# Patient Record
Sex: FEMALE | Race: WHITE | NOT HISPANIC OR LATINO
[De-identification: names, ages, dates, MRNs, and addresses within clinical notes are randomized per-mention and may not be internally consistent; named-entity substitution may affect disease eponyms.]

---

## 2019-03-24 ENCOUNTER — FORM ENCOUNTER (OUTPATIENT)
Age: 69
End: 2019-03-24

## 2019-03-25 ENCOUNTER — OUTPATIENT (OUTPATIENT)
Dept: OUTPATIENT SERVICES | Facility: HOSPITAL | Age: 69
LOS: 1 days | End: 2019-03-25
Payer: MEDICARE

## 2019-03-25 ENCOUNTER — NON-APPOINTMENT (OUTPATIENT)
Age: 69
End: 2019-03-25

## 2019-03-25 ENCOUNTER — APPOINTMENT (OUTPATIENT)
Dept: PULMONOLOGY | Facility: CLINIC | Age: 69
End: 2019-03-25
Payer: MEDICARE

## 2019-03-25 VITALS
BODY MASS INDEX: 22.2 KG/M2 | WEIGHT: 130 LBS | OXYGEN SATURATION: 97 % | RESPIRATION RATE: 12 BRPM | HEIGHT: 64 IN | TEMPERATURE: 98.4 F | DIASTOLIC BLOOD PRESSURE: 80 MMHG | HEART RATE: 70 BPM | SYSTOLIC BLOOD PRESSURE: 120 MMHG

## 2019-03-25 DIAGNOSIS — R06.02 SHORTNESS OF BREATH: ICD-10-CM

## 2019-03-25 DIAGNOSIS — Z87.19 PERSONAL HISTORY OF OTHER DISEASES OF THE DIGESTIVE SYSTEM: ICD-10-CM

## 2019-03-25 DIAGNOSIS — Z87.891 PERSONAL HISTORY OF NICOTINE DEPENDENCE: ICD-10-CM

## 2019-03-25 DIAGNOSIS — Z86.79 PERSONAL HISTORY OF OTHER DISEASES OF THE CIRCULATORY SYSTEM: ICD-10-CM

## 2019-03-25 PROBLEM — Z00.00 ENCOUNTER FOR PREVENTIVE HEALTH EXAMINATION: Status: ACTIVE | Noted: 2019-03-25

## 2019-03-25 PROCEDURE — 94010 BREATHING CAPACITY TEST: CPT

## 2019-03-25 PROCEDURE — 71046 X-RAY EXAM CHEST 2 VIEWS: CPT | Mod: 26

## 2019-03-25 PROCEDURE — 71046 X-RAY EXAM CHEST 2 VIEWS: CPT

## 2019-03-25 PROCEDURE — 36415 COLL VENOUS BLD VENIPUNCTURE: CPT

## 2019-03-25 PROCEDURE — 99204 OFFICE O/P NEW MOD 45 MIN: CPT | Mod: 25

## 2019-03-25 RX ORDER — AMLODIPINE BESYLATE AND VALSARTAN 10; 160 MG/1; MG/1
10-160 TABLET, FILM COATED ORAL
Refills: 0 | Status: ACTIVE | COMMUNITY

## 2019-03-25 RX ORDER — ASPIRIN 81 MG/1
81 TABLET, CHEWABLE ORAL
Refills: 0 | Status: ACTIVE | COMMUNITY

## 2019-03-25 RX ORDER — ESOMEPRAZOLE MAGNESIUM 40 MG/1
40 CAPSULE, DELAYED RELEASE ORAL
Refills: 0 | Status: ACTIVE | COMMUNITY

## 2019-03-25 RX ORDER — SIMVASTATIN 80 MG/1
TABLET, FILM COATED ORAL
Refills: 0 | Status: ACTIVE | COMMUNITY

## 2019-03-25 RX ORDER — BISOPROLOL FUMARATE 5 MG/1
TABLET, FILM COATED ORAL
Refills: 0 | Status: ACTIVE | COMMUNITY

## 2019-03-25 RX ORDER — ALBUTEROL SULFATE 90 UG/1
108 (90 BASE) AEROSOL, METERED RESPIRATORY (INHALATION)
Qty: 1 | Refills: 3 | Status: ACTIVE | COMMUNITY
Start: 2019-03-25 | End: 1900-01-01

## 2019-03-26 ENCOUNTER — OUTPATIENT (OUTPATIENT)
Dept: OUTPATIENT SERVICES | Facility: HOSPITAL | Age: 69
LOS: 1 days | End: 2019-03-26
Payer: MEDICARE

## 2019-03-26 DIAGNOSIS — I25.10 ATHEROSCLEROTIC HEART DISEASE OF NATIVE CORONARY ARTERY WITHOUT ANGINA PECTORIS: ICD-10-CM

## 2019-03-26 DIAGNOSIS — R94.30 ABNORMAL RESULT OF CARDIOVASCULAR FUNCTION STUDY, UNSPECIFIED: ICD-10-CM

## 2019-03-26 PROCEDURE — 93016 CV STRESS TEST SUPVJ ONLY: CPT

## 2019-03-26 PROCEDURE — 93325 DOPPLER ECHO COLOR FLOW MAPG: CPT | Mod: 26

## 2019-03-26 PROCEDURE — 93351 STRESS TTE COMPLETE: CPT

## 2019-03-26 PROCEDURE — 93018 CV STRESS TEST I&R ONLY: CPT

## 2019-03-26 PROCEDURE — 93320 DOPPLER ECHO COMPLETE: CPT | Mod: 26

## 2019-03-26 PROCEDURE — 93350 STRESS TTE ONLY: CPT | Mod: 26

## 2019-03-29 PROBLEM — Z87.891 FORMER SMOKER: Status: ACTIVE | Noted: 2019-03-25

## 2019-03-29 LAB
BASOPHILS # BLD AUTO: 0.05 K/UL
BASOPHILS NFR BLD AUTO: 0.6 %
DEPRECATED D DIMER PPP IA-ACNC: 163 NG/ML DDU
EOSINOPHIL # BLD AUTO: 0.16 K/UL
EOSINOPHIL NFR BLD AUTO: 1.8 %
HCT VFR BLD CALC: 38 %
HGB BLD-MCNC: 12.1 G/DL
IMM GRANULOCYTES NFR BLD AUTO: 0.2 %
LYMPHOCYTES # BLD AUTO: 1.75 K/UL
LYMPHOCYTES NFR BLD AUTO: 20 %
MAN DIFF?: NORMAL
MCHC RBC-ENTMCNC: 28.5 PG
MCHC RBC-ENTMCNC: 31.8 GM/DL
MCV RBC AUTO: 89.6 FL
MONOCYTES # BLD AUTO: 0.62 K/UL
MONOCYTES NFR BLD AUTO: 7.1 %
NEUTROPHILS # BLD AUTO: 6.13 K/UL
NEUTROPHILS NFR BLD AUTO: 70.3 %
PLATELET # BLD AUTO: 334 K/UL
RBC # BLD: 4.24 M/UL
RBC # FLD: 13.2 %
WBC # FLD AUTO: 8.73 K/UL

## 2019-03-29 NOTE — PHYSICAL EXAM
[General Appearance - Well Developed] : well developed [General Appearance - Well Nourished] : well nourished [Normal Conjunctiva] : the conjunctiva exhibited no abnormalities [Normal Oropharynx] : normal oropharynx [Neck Appearance] : the appearance of the neck was normal [Heart Rate And Rhythm] : heart rate and rhythm were normal [Heart Sounds] : normal S1 and S2 [Respiration, Rhythm And Depth] : normal respiratory rhythm and effort [Exaggerated Use Of Accessory Muscles For Inspiration] : no accessory muscle use [Auscultation Breath Sounds / Voice Sounds] : lungs were clear to auscultation bilaterally [Bowel Sounds] : normal bowel sounds [Abdomen Soft] : soft [Abdomen Tenderness] : non-tender [Abnormal Walk] : normal gait [Nail Clubbing] : no clubbing of the fingernails [Cyanosis, Localized] : no localized cyanosis [Skin Color & Pigmentation] : normal skin color and pigmentation [Skin Turgor] : normal skin turgor [] : no rash [Oriented To Time, Place, And Person] : oriented to person, place, and time [Impaired Insight] : insight and judgment were intact [Affect] : the affect was normal

## 2019-03-29 NOTE — CONSULT LETTER
[Dear  ___] : Dear  [unfilled], [Consult Letter:] : I had the pleasure of evaluating your patient, [unfilled]. [Please see my note below.] : Please see my note below. [Consult Closing:] : Thank you very much for allowing me to participate in the care of this patient.  If you have any questions, please do not hesitate to contact me. [Sincerely,] : Sincerely, [FreeTextEntry3] : Grace Aranda MD, [facility]

## 2019-03-29 NOTE — REVIEW OF SYSTEMS
[Cough] : no cough [Sputum] : not coughing up ~M sputum [Dyspnea] : dyspnea [Chest Tightness] : no chest tightness [Pleuritic Pain] : no pleuritic pain [Negative] : Psychiatric

## 2019-03-29 NOTE — HISTORY OF PRESENT ILLNESS
[FreeTextEntry1] : 68F former smoker with subacute to chronic sob. Pt reports over last 2 months noticed more MCNEILL with exercise like hiking, it is worse here in [facility] and improves when she travels. She has travelled on a plane for over 10 hours in the last 2 months. Denies any leg swelling, no chest pain, mild cough and phlegm. Described herself as very fit until recently.

## 2019-03-29 NOTE — ASSESSMENT
[FreeTextEntry1] : 68F with subacute to chronic dyspnea on exertion only. She has travelled by plane recently and I checked her ddimer to help r/o DVT/PE, which is normal/low. No evidence of anemia with normal Hgb in CBC. She is a former smoker and has mild hyperinflation on CXR, but no obstruction on spirometry and FEV1>100%.. Pt reports negative cardiac workup, but stress test repeat is pending tomorrow. For now, I would recommend trial of albuterol prn and return to complete full PFT study when pt returns from her trip

## 2021-03-19 ENCOUNTER — EMERGENCY (EMERGENCY)
Facility: HOSPITAL | Age: 71
LOS: 1 days | Discharge: HOME | End: 2021-03-19
Admitting: EMERGENCY MEDICINE
Payer: MEDICARE

## 2021-03-19 VITALS
OXYGEN SATURATION: 97 % | TEMPERATURE: 99 F | HEART RATE: 72 BPM | SYSTOLIC BLOOD PRESSURE: 136 MMHG | DIASTOLIC BLOOD PRESSURE: 76 MMHG | RESPIRATION RATE: 18 BRPM

## 2021-03-19 DIAGNOSIS — Z20.822 CONTACT WITH AND (SUSPECTED) EXPOSURE TO COVID-19: ICD-10-CM

## 2021-03-19 LAB — SARS-COV-2 RNA SPEC QL NAA+PROBE: SIGNIFICANT CHANGE UP

## 2021-03-19 PROCEDURE — 99283 EMERGENCY DEPT VISIT LOW MDM: CPT

## 2021-03-19 PROCEDURE — 99282 EMERGENCY DEPT VISIT SF MDM: CPT | Mod: CS

## 2021-03-19 PROCEDURE — U0003: CPT

## 2021-03-19 PROCEDURE — U0005: CPT

## 2021-03-19 NOTE — ED PROVIDER NOTE - PATIENT PORTAL LINK FT
You can access the FollowMyHealth Patient Portal offered by [facility] by registering at the following website: [email or web address]. By joining [employer]’s FollowMyHealth portal, you will also be able to view your health information using other applications (apps) compatible with our system.

## 2021-04-02 ENCOUNTER — OUTPATIENT (OUTPATIENT)
Dept: OUTPATIENT SERVICES | Facility: HOSPITAL | Age: 71
LOS: 1 days | End: 2021-04-02
Payer: MEDICARE

## 2021-04-02 DIAGNOSIS — I25.10 ATHEROSCLEROTIC HEART DISEASE OF NATIVE CORONARY ARTERY WITHOUT ANGINA PECTORIS: ICD-10-CM

## 2021-04-02 PROCEDURE — 93351 STRESS TTE COMPLETE: CPT

## 2021-04-02 PROCEDURE — 93351 STRESS TTE COMPLETE: CPT | Mod: 26,52

## 2021-04-03 ENCOUNTER — EMERGENCY (EMERGENCY)
Facility: HOSPITAL | Age: 71
LOS: 1 days | Discharge: HOME | End: 2021-04-03
Admitting: EMERGENCY MEDICINE
Payer: MEDICARE

## 2021-04-03 VITALS
TEMPERATURE: 98 F | RESPIRATION RATE: 18 BRPM | SYSTOLIC BLOOD PRESSURE: 124 MMHG | OXYGEN SATURATION: 99 % | DIASTOLIC BLOOD PRESSURE: 74 MMHG | HEART RATE: 65 BPM

## 2021-04-03 LAB — SARS-COV-2 RNA SPEC QL NAA+PROBE: SIGNIFICANT CHANGE UP

## 2021-04-03 PROCEDURE — 99282 EMERGENCY DEPT VISIT SF MDM: CPT | Mod: CS

## 2021-04-03 PROCEDURE — U0003: CPT

## 2021-04-03 PROCEDURE — 99283 EMERGENCY DEPT VISIT LOW MDM: CPT

## 2021-04-03 PROCEDURE — U0005: CPT

## 2021-04-03 NOTE — ED PROVIDER NOTE - PATIENT PORTAL LINK FT
You can access the FollowMyHealth Patient Portal offered by [facility] by registering at the following website: [email or web address]. By joining [employer]’s FollowMyHealth portal, you will also be able to view your health information using other applications (apps) compatible with our system.

## 2021-04-05 DIAGNOSIS — Z20.822 CONTACT WITH AND (SUSPECTED) EXPOSURE TO COVID-19: ICD-10-CM

## 2021-11-22 ENCOUNTER — EMERGENCY (EMERGENCY)
Facility: HOSPITAL | Age: 71
LOS: 1 days | Discharge: HOME | End: 2021-11-22
Attending: EMERGENCY MEDICINE | Admitting: EMERGENCY MEDICINE
Payer: MEDICARE

## 2021-11-22 VITALS
HEIGHT: 62 IN | WEIGHT: 121.25 LBS | DIASTOLIC BLOOD PRESSURE: 74 MMHG | TEMPERATURE: 98 F | SYSTOLIC BLOOD PRESSURE: 127 MMHG | RESPIRATION RATE: 18 BRPM | OXYGEN SATURATION: 96 % | HEART RATE: 86 BPM

## 2021-11-22 DIAGNOSIS — S01.511A LACERATION WITHOUT FOREIGN BODY OF LIP, INITIAL ENCOUNTER: ICD-10-CM
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
## 2021-11-22 DIAGNOSIS — I10 ESSENTIAL (PRIMARY) HYPERTENSION: ICD-10-CM
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
## 2021-11-22 DIAGNOSIS — Z88.0 ALLERGY STATUS TO PENICILLIN: ICD-10-CM
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
## 2021-11-22 DIAGNOSIS — Z23 ENCOUNTER FOR IMMUNIZATION: ICD-10-CM

## 2021-11-22 PROCEDURE — G1004: CPT

## 2021-11-22 PROCEDURE — 12011 RPR F/E/E/N/L/M 2.5 CM/<: CPT

## 2021-11-22 PROCEDURE — 99284 EMERGENCY DEPT VISIT MOD MDM: CPT | Mod: 25

## 2021-11-22 PROCEDURE — 70486 CT MAXILLOFACIAL W/O DYE: CPT | Mod: 26,MG

## 2021-11-22 PROCEDURE — 73110 X-RAY EXAM OF WRIST: CPT | Mod: 26,RT

## 2021-11-22 PROCEDURE — 90471 IMMUNIZATION ADMIN: CPT

## 2021-11-22 PROCEDURE — 73110 X-RAY EXAM OF WRIST: CPT

## 2021-11-22 PROCEDURE — 70450 CT HEAD/BRAIN W/O DYE: CPT | Mod: MG

## 2021-11-22 PROCEDURE — 70486 CT MAXILLOFACIAL W/O DYE: CPT | Mod: MG

## 2021-11-22 PROCEDURE — 90715 TDAP VACCINE 7 YRS/> IM: CPT

## 2021-11-22 PROCEDURE — 70450 CT HEAD/BRAIN W/O DYE: CPT | Mod: 26,MG

## 2021-11-22 RX ORDER — TETANUS TOXOID, REDUCED DIPHTHERIA TOXOID AND ACELLULAR PERTUSSIS VACCINE, ADSORBED 5; 2.5; 8; 8; 2.5 [IU]/.5ML; [IU]/.5ML; UG/.5ML; UG/.5ML; UG/.5ML
0.5 SUSPENSION INTRAMUSCULAR ONCE
Refills: 0 | Status: COMPLETED | OUTPATIENT
Start: 2021-11-22 | End: 2021-11-22

## 2021-11-22 RX ORDER — ACETAMINOPHEN 500 MG
650 TABLET ORAL ONCE
Refills: 0 | Status: COMPLETED | OUTPATIENT
Start: 2021-11-22 | End: 2021-11-22

## 2021-11-22 RX ADMIN — TETANUS TOXOID, REDUCED DIPHTHERIA TOXOID AND ACELLULAR PERTUSSIS VACCINE, ADSORBED 0.5 MILLILITER(S): 5; 2.5; 8; 8; 2.5 SUSPENSION INTRAMUSCULAR at 15:04

## 2021-11-22 RX ADMIN — Medication 650 MILLIGRAM(S): at 15:06

## 2021-11-22 NOTE — ED PROVIDER NOTE - CLINICAL SUMMARY MEDICAL DECISION MAKING FREE TEXT BOX
70 y/o f hx HTN presents s/p trip and fall, hitting her face today; vss, no neuro deficits on exam.  +swelling to nasal bridge and likely fracture, will plan for CT maxillofacial and CT head.  Lip lac repaired, tetanus updated, given tylenol for pain.

## 2021-11-22 NOTE — ED ADULT NURSE NOTE - OBJECTIVE STATEMENT
Pt presents to ED after mechanical fall today. Pt denies dizziness, lightheadedness, LOC, double vision. Pt states she tripped and fell over construction equipment. Pt cut her bottom lip on her teeth and states her nose looks swollen. Pt takes daily aspirin. Pt also states has been on antibiotics for bronchitis [id number].

## 2021-11-22 NOTE — ED PROVIDER NOTE - ENMT, MLM
+swelling to nasal bridge with ecchymosis, no active epistaxis, no septal hematoma.  1 cm lac to inside of lower lip, does not communicate with the outside of the lip, dentition intact

## 2021-11-22 NOTE — ED PROVIDER NOTE - ATTENDING CONTRIBUTION TO CARE
Vitals wnl, exam as above.  CT showing "Bilateral fractures of the nasal bones and of the anterior-superior nasal septum, minimally displaced. There is diffuse paranasal sinus opacification, which may be posttraumatic hemorrhage and/or concomitant sinusitis. No visible orbital/facial or skull base fracture."  Given tylenol/adacel. ppx abx for possible sinus involvement.  Discussed importance of outpt follow up and return precautions. Clinically no indication for further emergent ED workup or hospitalization at this time. Comfortable for dc, outpt f/u.   lip lac repaired by PA (given size of lac).   ddx: mechanical fall. subsequent nasal fx.

## 2021-11-22 NOTE — ED ADULT TRIAGE NOTE - CHIEF COMPLAINT QUOTE
Patient states was walking in street today, there was construction in the street, had mechanical trip and fall at 12 noon today and fell on face, denies any LOC, on Aspirin.  States was able to get up right away, arrives w/ dried blood on right nostril, laceration to lip, bleeding controlled, concerned for nasal/facial fracture.  Denies any dizziness, lighheadedness or headache, states has nasal and mouth pain.   Unknown Tetanus vaccine status.

## 2021-11-22 NOTE — ED PROVIDER NOTE - OBJECTIVE STATEMENT
70 y/o f hx HTN presents s/p mechanical fall, hit her face on the ground.  Pt reports pain to bridge of her nose, had a nosebleed which resolved.  Pt also has a laceration to the inside of her lower lip which is painful.  Denies LOC, neck/back pain, ext pain, all other ROS negative.

## 2021-11-22 NOTE — ED ADULT NURSE NOTE - NSIMPLEMENTINTERV_GEN_ALL_ED
Implemented All Fall Risk Interventions:  [place] to call system. Call bell, personal items and telephone within reach. Instruct patient to call for assistance. Room bathroom lighting operational. Non-slip footwear when patient is off stretcher. Physically safe environment: no spills, clutter or unnecessary equipment. Stretcher in lowest position, wheels locked, appropriate side rails in place. Provide visual cue, wrist band, yellow gown, etc. Monitor gait and stability. Monitor for mental status changes and reorient to person, place, and time. Review medications for side effects contributing to fall risk. Reinforce activity limits and safety measures with patient and family.

## 2021-11-22 NOTE — ED PROVIDER NOTE - PATIENT PORTAL LINK FT
You can access the FollowMyHealth Patient Portal offered by [facility] by registering at the following website: [email or web address]. By joining [employer]’s FollowMyHealth portal, you will also be able to view your health information using other applications (apps) compatible with our system.

## 2021-11-22 NOTE — ED PROVIDER NOTE - CARE PROVIDER_API CALL
Jose Alejandro Alford (DDS; MD)  Dental Anesthesiology; OralMaxillofacial Surgery  [address] [place] [address]  Phone: [phone]  Fax: [phone]  Follow Up Time:

## 2021-11-22 NOTE — ED PROVIDER NOTE - NSFOLLOWUPINSTRUCTIONS_ED_ALL_ED_FT
Care For Your Absorbable Stitches    WHAT YOU NEED TO KNOW:    Absorbable stitches, or sutures, are used to close cuts or wounds. These stitches are absorbed by your body, or fall off on their own within days or weeks. They do not need to be removed.             DISCHARGE INSTRUCTIONS:    Return to the emergency department if:   •Your wound comes apart.       •You have red streaks around your wound.      •You have swollen or painful lymph nodes.      •Blood soaks through your bandage.      Contact your healthcare provider if:   •You have signs of an infection, such as increased redness, pain, swelling, or pus.      •You have a fever.      •Your stitches do not absorb when your healthcare provider says they should.      •You have questions or concerns about your condition or care.      Care for your wound and absorbable stitches as directed:   •Protect the stitches. Wear protective clothing over the stitches, and protect the area from sunlight. Do not place pressure on your wound. This could open your wound and increase your risk for an infection.      •Clean your wound as directed. Carefully wash the wound with soap and water. Gently dry the area and put on new, clean bandages as directed. Change your bandages when they get wet or dirty. Check your wound for signs of infection when you clean it. Signs include redness, swelling, and pus.      •Keep the area dry as directed. Wait 12 to 24 hours after you receive your stitches before you take a shower. Take showers instead of baths. Do not take a bath or swim. Your healthcare provider will give you instructions for bathing with your stitches.      Help your wound heal:   •Elevate your wound. Keep your wound above the level of your heart as often as you can. This will help decrease swelling and pain. Prop the area on pillows or blankets, if possible, to keep it elevated comfortably.      •Limit activity. Do not stretch the skin around your wound. This will help prevent bleeding and swelling.      Follow up with your doctor as directed: Write down your questions so you remember to ask them during your visits.

## 2021-11-22 NOTE — ED PROVIDER NOTE - PROGRESS NOTE DETAILS
CT shows b/l nasal bone fractures, nasal septum fracture.  Will d/c pt on doxycycline (pcn allergy), to f/u with ENT upon return home to [facility].

## 2025-04-20 NOTE — ED ADULT TRIAGE NOTE - AS TEMP SITE
Patient arrives from park with cc of suicidal ideation with plan. Patient states she wants to overdose on insulin. Patient endorses suicidal thoughts for the last several hours. VSS en route. No other complaints at this time.  
forehead